# Patient Record
Sex: FEMALE | Race: WHITE | NOT HISPANIC OR LATINO
[De-identification: names, ages, dates, MRNs, and addresses within clinical notes are randomized per-mention and may not be internally consistent; named-entity substitution may affect disease eponyms.]

---

## 2018-05-07 ENCOUNTER — RESULT REVIEW (OUTPATIENT)
Age: 63
End: 2018-05-07

## 2018-05-08 PROBLEM — Z00.00 ENCOUNTER FOR PREVENTIVE HEALTH EXAMINATION: Status: ACTIVE | Noted: 2018-05-08

## 2018-05-22 ENCOUNTER — APPOINTMENT (OUTPATIENT)
Dept: RADIOLOGY | Facility: CLINIC | Age: 63
End: 2018-05-22
Payer: COMMERCIAL

## 2018-05-22 ENCOUNTER — APPOINTMENT (OUTPATIENT)
Dept: MAMMOGRAPHY | Facility: CLINIC | Age: 63
End: 2018-05-22
Payer: COMMERCIAL

## 2018-05-22 ENCOUNTER — OUTPATIENT (OUTPATIENT)
Dept: OUTPATIENT SERVICES | Facility: HOSPITAL | Age: 63
LOS: 1 days | End: 2018-05-22
Payer: COMMERCIAL

## 2018-05-22 ENCOUNTER — APPOINTMENT (OUTPATIENT)
Dept: ULTRASOUND IMAGING | Facility: CLINIC | Age: 63
End: 2018-05-22
Payer: COMMERCIAL

## 2018-05-22 DIAGNOSIS — Z12.31 ENCOUNTER FOR SCREENING MAMMOGRAM FOR MALIGNANT NEOPLASM OF BREAST: ICD-10-CM

## 2018-05-22 PROCEDURE — 77063 BREAST TOMOSYNTHESIS BI: CPT | Mod: 26

## 2018-05-22 PROCEDURE — 76641 ULTRASOUND BREAST COMPLETE: CPT | Mod: 26,50

## 2018-05-22 PROCEDURE — 77063 BREAST TOMOSYNTHESIS BI: CPT

## 2018-05-22 PROCEDURE — 77067 SCR MAMMO BI INCL CAD: CPT | Mod: 26

## 2018-05-22 PROCEDURE — 77080 DXA BONE DENSITY AXIAL: CPT | Mod: 26

## 2018-05-22 PROCEDURE — 77080 DXA BONE DENSITY AXIAL: CPT

## 2018-05-22 PROCEDURE — 77067 SCR MAMMO BI INCL CAD: CPT

## 2018-05-22 PROCEDURE — 76641 ULTRASOUND BREAST COMPLETE: CPT

## 2018-11-20 ENCOUNTER — APPOINTMENT (OUTPATIENT)
Dept: ENDOCRINOLOGY | Facility: CLINIC | Age: 63
End: 2018-11-20
Payer: COMMERCIAL

## 2018-11-20 VITALS
SYSTOLIC BLOOD PRESSURE: 139 MMHG | BODY MASS INDEX: 23.22 KG/M2 | WEIGHT: 123 LBS | HEIGHT: 61 IN | DIASTOLIC BLOOD PRESSURE: 74 MMHG | HEART RATE: 81 BPM

## 2018-11-20 DIAGNOSIS — Z87.39 PERSONAL HISTORY OF OTHER DISEASES OF THE MUSCULOSKELETAL SYSTEM AND CONNECTIVE TISSUE: ICD-10-CM

## 2018-11-20 DIAGNOSIS — Z84.2 FAMILY HISTORY OF OTHER DISEASES OF THE GENITOURINARY SYSTEM: ICD-10-CM

## 2018-11-20 DIAGNOSIS — Z56.0 UNEMPLOYMENT, UNSPECIFIED: ICD-10-CM

## 2018-11-20 PROCEDURE — 99205 OFFICE O/P NEW HI 60 MIN: CPT

## 2018-11-20 RX ORDER — CHROMIUM 200 MCG
25 TABLET ORAL
Refills: 0 | Status: ACTIVE | COMMUNITY

## 2018-11-20 RX ORDER — ZOLPIDEM TARTRATE 10 MG/1
10 TABLET, FILM COATED ORAL
Refills: 0 | Status: ACTIVE | COMMUNITY

## 2018-11-20 RX ORDER — PYRIDOXINE HCL (VITAMIN B6) 100 MG
TABLET ORAL
Refills: 0 | Status: ACTIVE | COMMUNITY

## 2018-11-20 RX ORDER — FLUTICASONE PROPIONATE 50 UG/1
50 SPRAY, METERED NASAL
Refills: 0 | Status: ACTIVE | COMMUNITY

## 2018-11-20 RX ORDER — PANTOPRAZOLE 40 MG/1
40 TABLET, DELAYED RELEASE ORAL
Refills: 0 | Status: ACTIVE | COMMUNITY

## 2018-11-20 RX ORDER — ERGOCALCIFEROL 1.25 MG/1
1.25 MG CAPSULE ORAL
Refills: 0 | Status: ACTIVE | COMMUNITY

## 2018-11-20 RX ORDER — AZELASTINE HYDROCHLORIDE 137 UG/1
137 SPRAY, METERED NASAL
Refills: 0 | Status: ACTIVE | COMMUNITY

## 2018-11-20 RX ORDER — ATORVASTATIN CALCIUM 10 MG/1
10 TABLET, FILM COATED ORAL
Refills: 0 | Status: ACTIVE | COMMUNITY

## 2018-11-20 SDOH — ECONOMIC STABILITY - INCOME SECURITY: UNEMPLOYMENT, UNSPECIFIED: Z56.0

## 2019-05-08 ENCOUNTER — APPOINTMENT (OUTPATIENT)
Dept: OBGYN | Facility: CLINIC | Age: 64
End: 2019-05-08
Payer: COMMERCIAL

## 2019-05-08 ENCOUNTER — RECORD ABSTRACTING (OUTPATIENT)
Age: 64
End: 2019-05-08

## 2019-05-08 ENCOUNTER — APPOINTMENT (OUTPATIENT)
Dept: OBGYN | Facility: CLINIC | Age: 64
End: 2019-05-08

## 2019-05-08 VITALS
SYSTOLIC BLOOD PRESSURE: 116 MMHG | HEIGHT: 61 IN | DIASTOLIC BLOOD PRESSURE: 68 MMHG | WEIGHT: 125 LBS | BODY MASS INDEX: 23.6 KG/M2

## 2019-05-08 DIAGNOSIS — Z82.49 FAMILY HISTORY OF ISCHEMIC HEART DISEASE AND OTHER DISEASES OF THE CIRCULATORY SYSTEM: ICD-10-CM

## 2019-05-08 DIAGNOSIS — G45.9 TRANSIENT CEREBRAL ISCHEMIC ATTACK, UNSPECIFIED: ICD-10-CM

## 2019-05-08 DIAGNOSIS — Z79.890 HORMONE REPLACEMENT THERAPY: ICD-10-CM

## 2019-05-08 DIAGNOSIS — D21.9 BENIGN NEOPLASM OF CONNECTIVE AND OTHER SOFT TISSUE, UNSPECIFIED: ICD-10-CM

## 2019-05-08 DIAGNOSIS — Z78.9 OTHER SPECIFIED HEALTH STATUS: ICD-10-CM

## 2019-05-08 DIAGNOSIS — Z92.89 PERSONAL HISTORY OF OTHER MEDICAL TREATMENT: ICD-10-CM

## 2019-05-08 LAB
BILIRUB UR QL STRIP: NORMAL
CYTOLOGY CVX/VAG DOC THIN PREP: NORMAL
CYTOLOGY CVX/VAG DOC THIN PREP: NORMAL
DATE COLLECTED: NORMAL
GLUCOSE UR-MCNC: NORMAL
HCG UR QL: 0.2 EU/DL
HEMOCCULT SP1 STL QL: NEGATIVE
HGB UR QL STRIP.AUTO: ABNORMAL
KETONES UR-MCNC: NORMAL
LEUKOCYTE ESTERASE UR QL STRIP: NORMAL
NITRITE UR QL STRIP: NORMAL
PH UR STRIP: 5.5
PROT UR STRIP-MCNC: NORMAL
QUALITY CONTROL: YES
SP GR UR STRIP: 1.01

## 2019-05-08 PROCEDURE — 82270 OCCULT BLOOD FECES: CPT

## 2019-05-08 PROCEDURE — 81003 URINALYSIS AUTO W/O SCOPE: CPT | Mod: QW

## 2019-05-08 PROCEDURE — 99396 PREV VISIT EST AGE 40-64: CPT

## 2019-05-08 NOTE — HISTORY OF PRESENT ILLNESS
[1 Year Ago] : 1 year ago [Healthy Diet] : a healthy diet [Good] : being in good health [Last Mammogram ___] : Last Mammogram was [unfilled] [Regular Exercise] : regular exercise [Last Pap ___] : Last cervical pap smear was [unfilled] [Last Bone Density ___] : Last bone density studies [unfilled] [Postmenopausal] : is postmenopausal [Hot Flashes] : hot flashes [unknown] : the patient is unsure of the date of her LMP [Post-Menopause, No Sxs] : post-menopausal, currently without symptoms [Sexually Active] : is sexually active [Monogamous] : is monogamous [Male ___] : [unfilled] male [Weight Concerns] : no concerns with her weight [de-identified] : DIANNE 6/1/2018 - BR 2 [Night Sweats] : no night sweats [Vaginal Itching] : no vaginal itching [Dyspareunia] : no dyspareunia [Mood Changes] : no mood changes [FreeTextEntry8] : VAGINAL DRYNESS [Contraception] : does not use contraception

## 2019-05-08 NOTE — REVIEW OF SYSTEMS
[Anxiety] : anxiety [Nl] : Hematologic/Lymphatic [Fever] : no fever [Chills] : no chills [Chest Pain] : no chest pain [Dyspnea] : no shortness of breath [Abdominal Pain] : no abdominal pain [Vomiting] : no vomiting [Pelvic Pain] : no pelvic pain [Abn Vag Bleeding] : no abnormal vaginal bleeding

## 2019-05-08 NOTE — PHYSICAL EXAM
[Awake] : awake [Alert] : alert [Acute Distress] : no acute distress [Mass] : no breast mass [Nipple Discharge] : no nipple discharge [Axillary LAD] : no axillary lymphadenopathy [Soft] : soft [Tender] : non tender [Distended] : not distended [Depressed Mood] : not depressed [Oriented x3] : oriented to person, place, and time [Flat Affect] : affect not flat [Normal] : uterus [Labia Majora] : labia major [Labia Minora] : labia minora [Atrophy] : atrophy [Pap Obtained] : a Pap smear was performed [No Tenderness] : no rectal tenderness [Occult Blood] : occult blood test from digital rectal exam was negative

## 2019-05-13 LAB
CYTOLOGY CVX/VAG DOC THIN PREP: NORMAL
HPV HIGH+LOW RISK DNA PNL CVX: NOT DETECTED

## 2019-05-22 ENCOUNTER — FORM ENCOUNTER (OUTPATIENT)
Age: 64
End: 2019-05-22

## 2019-05-23 ENCOUNTER — OUTPATIENT (OUTPATIENT)
Dept: OUTPATIENT SERVICES | Facility: HOSPITAL | Age: 64
LOS: 1 days | End: 2019-05-23
Payer: COMMERCIAL

## 2019-05-23 ENCOUNTER — APPOINTMENT (OUTPATIENT)
Dept: MAMMOGRAPHY | Facility: CLINIC | Age: 64
End: 2019-05-23
Payer: COMMERCIAL

## 2019-05-23 ENCOUNTER — APPOINTMENT (OUTPATIENT)
Dept: ULTRASOUND IMAGING | Facility: CLINIC | Age: 64
End: 2019-05-23
Payer: COMMERCIAL

## 2019-05-23 DIAGNOSIS — R92.8 OTHER ABNORMAL AND INCONCLUSIVE FINDINGS ON DIAGNOSTIC IMAGING OF BREAST: ICD-10-CM

## 2019-05-23 PROCEDURE — 76641 ULTRASOUND BREAST COMPLETE: CPT | Mod: 26,50

## 2019-05-23 PROCEDURE — 77067 SCR MAMMO BI INCL CAD: CPT

## 2019-05-23 PROCEDURE — 77067 SCR MAMMO BI INCL CAD: CPT | Mod: 26

## 2019-05-23 PROCEDURE — 77063 BREAST TOMOSYNTHESIS BI: CPT | Mod: 26

## 2019-05-23 PROCEDURE — 77063 BREAST TOMOSYNTHESIS BI: CPT

## 2019-05-23 PROCEDURE — 76641 ULTRASOUND BREAST COMPLETE: CPT

## 2020-10-26 ENCOUNTER — APPOINTMENT (OUTPATIENT)
Dept: OBGYN | Facility: CLINIC | Age: 65
End: 2020-10-26
Payer: COMMERCIAL

## 2020-10-26 VITALS
WEIGHT: 140 LBS | HEIGHT: 61 IN | DIASTOLIC BLOOD PRESSURE: 78 MMHG | SYSTOLIC BLOOD PRESSURE: 138 MMHG | BODY MASS INDEX: 26.43 KG/M2

## 2020-10-26 DIAGNOSIS — E04.2 NONTOXIC MULTINODULAR GOITER: ICD-10-CM

## 2020-10-26 DIAGNOSIS — R68.81 EARLY SATIETY: ICD-10-CM

## 2020-10-26 DIAGNOSIS — E06.3 AUTOIMMUNE THYROIDITIS: ICD-10-CM

## 2020-10-26 DIAGNOSIS — N64.4 MASTODYNIA: ICD-10-CM

## 2020-10-26 DIAGNOSIS — Z87.891 PERSONAL HISTORY OF NICOTINE DEPENDENCE: ICD-10-CM

## 2020-10-26 DIAGNOSIS — E78.5 HYPERLIPIDEMIA, UNSPECIFIED: ICD-10-CM

## 2020-10-26 DIAGNOSIS — R92.2 INCONCLUSIVE MAMMOGRAM: ICD-10-CM

## 2020-10-26 PROCEDURE — 99396 PREV VISIT EST AGE 40-64: CPT

## 2020-10-26 PROCEDURE — 99072 ADDL SUPL MATRL&STAF TM PHE: CPT

## 2020-10-26 RX ORDER — CLOBETASOL PROPIONATE 0.5 MG/ML
0.05 SOLUTION TOPICAL
Qty: 50 | Refills: 0 | Status: ACTIVE | COMMUNITY
Start: 2020-08-07

## 2020-10-26 RX ORDER — CLINDAMYCIN PHOSPHATE 1 G/10ML
1 GEL TOPICAL
Qty: 60 | Refills: 0 | Status: ACTIVE | COMMUNITY
Start: 2020-07-11

## 2020-10-26 RX ORDER — TRAMADOL HYDROCHLORIDE 50 MG/1
50 TABLET, COATED ORAL
Qty: 20 | Refills: 0 | Status: ACTIVE | COMMUNITY
Start: 2020-06-23

## 2020-10-26 RX ORDER — AZITHROMYCIN 250 MG/1
250 TABLET, FILM COATED ORAL
Qty: 6 | Refills: 0 | Status: ACTIVE | COMMUNITY
Start: 2020-06-23

## 2020-10-26 RX ORDER — CHLORHEXIDINE GLUCONATE, 0.12% ORAL RINSE 1.2 MG/ML
0.12 SOLUTION DENTAL
Qty: 473 | Refills: 0 | Status: ACTIVE | COMMUNITY
Start: 2020-06-23

## 2020-10-26 RX ORDER — CHOLECALCIFEROL (VITAMIN D3) 1250 MCG
1.25 MG CAPSULE ORAL
Qty: 12 | Refills: 0 | Status: ACTIVE | COMMUNITY
Start: 2020-06-10

## 2020-10-26 RX ORDER — BENZOYL PEROXIDE 100 MG/ML
10 LIQUID TOPICAL
Qty: 142 | Refills: 0 | Status: ACTIVE | COMMUNITY
Start: 2020-07-11

## 2020-10-26 NOTE — HISTORY OF PRESENT ILLNESS
[Patient reported breast sonogram was normal] : Patient reported breast sonogram was normal [Patient reported PAP Smear was normal] : Patient reported PAP Smear was normal [LMP unknown] : LMP unknown [postmenopausal] : postmenopausal [N] : Patient does not use contraception [Y] : Positive pregnancy history [unknown] : Patient is unsure of the date of her LMP [Menarche Age: ____] : age at menarche was [unfilled] [Previously active] : previously active [Men] : men [No] : No [TextBox_4] : 65-year-old patient presents for annual gynecological exam\par \par She has an increase in hot flashes, bilateral breast pain, anxiety symptoms and weight gain\par \par She is no longer on any hormone replacement therapy with her primary doctor Ananya but was inquiring about a hormonal patch and I advised against it related to the increased risk for invasive breast cancer and her nicotine use as well as her mother's thrombophilia disorder\par \par She feels since the stress of Covid she has been having a hard time managing her anxiety admits to smoking a lot more with her e-cigarette\par \par She had an upper endoscopy and colonoscopy which was negative from a colorectal perspective but noted reflux and we discussed the need to restrict smoking-cessation strategies reviewed\par \par Patient is having weekly acupuncture and massage for her history of anxiety fatigue and sleep disturbances\par \par She continues to use Ambien to sleep every night and admits that her  tells her he finds her eating at night in the kitchen and she does not remember this in the morning-we reviewed the precautions with Ambien and I do advise she discuss this with her medical doctor-try to wean off of Ambien slowly related to the side effect profile but only on the medical care\par \par I advised the patient to have a screening bilateral breast ultrasound for her diffuse bilateral intermittent breast pain with her annual screening mammogram this year\par \par Patient denies any lump on her self breast exam\par \par She is under the care of a rheumatologist and Dr. Hare and had a bone density in 2018 and she will follow-up for repeat and follow-up with rheumatology\par \par  [Mammogramdate] : 05/23/2019 [TextBox_19] : BR2 [BreastSonogramDate] : 05/23/2019 [TextBox_25] : BR2 [PapSmeardate] : 05/08/2019 [BoneDensityDate] : 05/22/2018 [TextBox_37] : osteopenia [PGHxTotal] : 4 [[facility]] : 3 [[facility]iving] : 3 [PGHxABInduced] : 1

## 2020-10-26 NOTE — PLAN
[FreeTextEntry1] : We discussed the differentials for breast pain and the importance of performing the monthly self breast exam.\par \par The benefits of evening primrose oil was discussed\par \par Patient advised to monitor and call for any persisting symptoms

## 2020-10-26 NOTE — COUNSELING
[Nutrition/ Exercise/ Weight Management] : nutrition, exercise, weight management [Vitamins/Supplements] : vitamins/supplements [Sunscreen] : sunscreen [Breast Self Exam] : breast self exam [Influenza Vaccine] : influenza vaccine

## 2020-11-02 LAB
CYTOLOGY CVX/VAG DOC THIN PREP: ABNORMAL
HPV HIGH+LOW RISK DNA PNL CVX: NOT DETECTED

## 2020-11-07 ENCOUNTER — RESULT REVIEW (OUTPATIENT)
Age: 65
End: 2020-11-07

## 2020-11-07 ENCOUNTER — OUTPATIENT (OUTPATIENT)
Dept: OUTPATIENT SERVICES | Facility: HOSPITAL | Age: 65
LOS: 1 days | End: 2020-11-07
Payer: COMMERCIAL

## 2020-11-07 ENCOUNTER — APPOINTMENT (OUTPATIENT)
Dept: ULTRASOUND IMAGING | Facility: CLINIC | Age: 65
End: 2020-11-07
Payer: COMMERCIAL

## 2020-11-07 ENCOUNTER — APPOINTMENT (OUTPATIENT)
Dept: MAMMOGRAPHY | Facility: CLINIC | Age: 65
End: 2020-11-07
Payer: COMMERCIAL

## 2020-11-07 DIAGNOSIS — Z01.419 ENCOUNTER FOR GYNECOLOGICAL EXAMINATION (GENERAL) (ROUTINE) WITHOUT ABNORMAL FINDINGS: ICD-10-CM

## 2020-11-07 DIAGNOSIS — R92.2 INCONCLUSIVE MAMMOGRAM: ICD-10-CM

## 2020-11-07 DIAGNOSIS — Z00.8 ENCOUNTER FOR OTHER GENERAL EXAMINATION: ICD-10-CM

## 2020-11-07 DIAGNOSIS — M85.80 OTHER SPECIFIED DISORDERS OF BONE DENSITY AND STRUCTURE, UNSPECIFIED SITE: ICD-10-CM

## 2020-11-07 PROCEDURE — 76641 ULTRASOUND BREAST COMPLETE: CPT

## 2020-11-07 PROCEDURE — 77067 SCR MAMMO BI INCL CAD: CPT | Mod: 26

## 2020-11-07 PROCEDURE — 76641 ULTRASOUND BREAST COMPLETE: CPT | Mod: 26,50

## 2020-11-07 PROCEDURE — 77080 DXA BONE DENSITY AXIAL: CPT | Mod: 26

## 2020-11-07 PROCEDURE — 77063 BREAST TOMOSYNTHESIS BI: CPT | Mod: 26

## 2020-11-07 PROCEDURE — 77067 SCR MAMMO BI INCL CAD: CPT

## 2020-11-07 PROCEDURE — 77080 DXA BONE DENSITY AXIAL: CPT

## 2020-11-07 PROCEDURE — 77063 BREAST TOMOSYNTHESIS BI: CPT

## 2020-12-11 ENCOUNTER — NON-APPOINTMENT (OUTPATIENT)
Age: 65
End: 2020-12-11

## 2021-11-16 ENCOUNTER — NON-APPOINTMENT (OUTPATIENT)
Age: 66
End: 2021-11-16

## 2021-11-16 ENCOUNTER — APPOINTMENT (OUTPATIENT)
Dept: OBGYN | Facility: CLINIC | Age: 66
End: 2021-11-16
Payer: COMMERCIAL

## 2021-11-16 VITALS
SYSTOLIC BLOOD PRESSURE: 122 MMHG | DIASTOLIC BLOOD PRESSURE: 68 MMHG | BODY MASS INDEX: 26.43 KG/M2 | WEIGHT: 140 LBS | HEIGHT: 61 IN

## 2021-11-16 DIAGNOSIS — M85.80 OTHER SPECIFIED DISORDERS OF BONE DENSITY AND STRUCTURE, UNSPECIFIED SITE: ICD-10-CM

## 2021-11-16 DIAGNOSIS — Z98.890 OTHER SPECIFIED POSTPROCEDURAL STATES: ICD-10-CM

## 2021-11-16 DIAGNOSIS — R23.2 FLUSHING: ICD-10-CM

## 2021-11-16 DIAGNOSIS — D26.9 OTHER BENIGN NEOPLASM OF UTERUS, UNSPECIFIED: ICD-10-CM

## 2021-11-16 PROCEDURE — 99397 PER PM REEVAL EST PAT 65+ YR: CPT

## 2021-11-16 NOTE — PLAN
[FreeTextEntry1] : Benefits of daily exercise vitamin D and calcium reviewed\par Benefits of monthly self breast exam reviewed\par Follow-up in 1 year or sooner as needed

## 2021-11-16 NOTE — HISTORY OF PRESENT ILLNESS
[LMP unknown] : LMP unknown [postmenopausal] : postmenopausal [N] : Patient does not use contraception [Y] : Positive pregnancy history [unknown] : Patient is unsure of the date of her LMP [Menarche Age: ____] : age at menarche was [unfilled] [Currently Active] : currently active [Men] : men [Vaginal] : vaginal [No] : No [Patient refuses STI testing] : Patient refuses STI testing [FreeTextEntry1] : 66-year-old patient presents for annual gynecological exam\par She is doing well and denies any postmenopausal bleeding\par \par She had a normal mammogram and screening breast ultrasound in November 2020 BI-RADS 2\par Her last bone density was November 2020\par She is scheduled for her screening colonoscopy and we reviewed the benefits of screening colonoscopy in the early detection and prevention of colorectal cancer\par \par Her daughter who also sees me lives in [facility]-\par  [Mammogramdate] : 11/07/20 [TextBox_19] : BR2 [TextBox_25] : BR2 [BreastSonogramDate] : 11/07/20 [PapSmeardate] : 10/26/20 [TextBox_31] : NEG [BoneDensityDate] : 11/0720 [HPVDate] : 10/26/20 [TextBox_78] : NEG [PGHxTotal] : 4 [[facility]Term] : 3 [PGHxAbortions] : 1

## 2021-11-22 ENCOUNTER — RESULT REVIEW (OUTPATIENT)
Age: 66
End: 2021-11-22

## 2021-11-22 ENCOUNTER — OUTPATIENT (OUTPATIENT)
Dept: OUTPATIENT SERVICES | Facility: HOSPITAL | Age: 66
LOS: 1 days | End: 2021-11-22
Payer: COMMERCIAL

## 2021-11-22 ENCOUNTER — APPOINTMENT (OUTPATIENT)
Dept: MAMMOGRAPHY | Facility: CLINIC | Age: 66
End: 2021-11-22
Payer: COMMERCIAL

## 2021-11-22 ENCOUNTER — APPOINTMENT (OUTPATIENT)
Dept: ULTRASOUND IMAGING | Facility: CLINIC | Age: 66
End: 2021-11-22
Payer: COMMERCIAL

## 2021-11-22 DIAGNOSIS — Z00.8 ENCOUNTER FOR OTHER GENERAL EXAMINATION: ICD-10-CM

## 2021-11-22 DIAGNOSIS — Z12.39 ENCOUNTER FOR OTHER SCREENING FOR MALIGNANT NEOPLASM OF BREAST: ICD-10-CM

## 2021-11-22 DIAGNOSIS — Z98.890 OTHER SPECIFIED POSTPROCEDURAL STATES: ICD-10-CM

## 2021-11-22 PROCEDURE — 76641 ULTRASOUND BREAST COMPLETE: CPT | Mod: 26,50

## 2021-11-22 PROCEDURE — 77067 SCR MAMMO BI INCL CAD: CPT

## 2021-11-22 PROCEDURE — 77067 SCR MAMMO BI INCL CAD: CPT | Mod: 26

## 2021-11-22 PROCEDURE — 77063 BREAST TOMOSYNTHESIS BI: CPT

## 2021-11-22 PROCEDURE — 76641 ULTRASOUND BREAST COMPLETE: CPT

## 2021-11-22 PROCEDURE — 77063 BREAST TOMOSYNTHESIS BI: CPT | Mod: 26

## 2021-11-23 LAB
CYTOLOGY CVX/VAG DOC THIN PREP: ABNORMAL
HPV HIGH+LOW RISK DNA PNL CVX: NOT DETECTED

## 2022-08-29 ENCOUNTER — APPOINTMENT (OUTPATIENT)
Dept: ORTHOPEDIC SURGERY | Facility: CLINIC | Age: 67
End: 2022-08-29

## 2022-08-29 VITALS — HEIGHT: 61 IN | WEIGHT: 135 LBS | BODY MASS INDEX: 25.49 KG/M2

## 2022-08-29 DIAGNOSIS — S46.219A STRAIN OF MUSCLE, FASCIA AND TENDON OF OTHER PARTS OF BICEPS, UNSPECIFIED ARM, INITIAL ENCOUNTER: ICD-10-CM

## 2022-08-29 DIAGNOSIS — M75.41 IMPINGEMENT SYNDROME OF RIGHT SHOULDER: ICD-10-CM

## 2022-08-29 DIAGNOSIS — M75.121 COMPLETE ROTATOR CUFF TEAR OR RUPTURE OF RIGHT SHOULDER, NOT SPECIFIED AS TRAUMATIC: ICD-10-CM

## 2022-08-29 DIAGNOSIS — M25.511 PAIN IN RIGHT SHOULDER: ICD-10-CM

## 2022-08-29 PROCEDURE — 73010 X-RAY EXAM OF SHOULDER BLADE: CPT | Mod: RT

## 2022-08-29 PROCEDURE — 73030 X-RAY EXAM OF SHOULDER: CPT | Mod: RT

## 2022-08-29 RX ORDER — METHYLPREDNISOLONE 4 MG/1
4 TABLET ORAL
Qty: 1 | Refills: 0 | Status: ACTIVE | COMMUNITY
Start: 2022-08-29 | End: 1900-01-01

## 2022-08-29 NOTE — IMAGING
[Right] : right shoulder [FreeTextEntry1] : There are AC changes.  The GH joint is OK. Osteopenia is suggested. [FreeTextEntry5] : There is a Type II acromion with a spur.

## 2022-08-29 NOTE — CONSULT LETTER
[Dear  ___] : Dear  [unfilled], [Consult Letter:] : I had the pleasure of evaluating your patient, [unfilled]. [Please see my note below.] : Please see my note below. [Consult Closing:] : Thank you very much for allowing me to participate in the care of this patient.  If you have any questions, please do not hesitate to contact me. [Sincerely,] : Sincerely, [FreeTextEntry3] : Nazario Inman M.D.\par Shoulder Surgery

## 2022-08-29 NOTE — PHYSICAL EXAM
[Right] : right shoulder [Sitting] : sitting [Mild] : mild [5___] : external rotation 5[unfilled]/5 [5 ___] : forward flexion 5[unfilled]/5 [] : no ecchymosis [de-identified] : -bellypress [TWNoteComboBox4] : passive forward flexion 165 degrees [TWNoteComboBox6] : internal rotation L1 [de-identified] : external rotation 75 degrees

## 2022-08-29 NOTE — HISTORY OF PRESENT ILLNESS
[4] : 4 [1] : 2 [Burning] : burning [Meds] : meds [] : no [FreeTextEntry1] : right shoulder, right thigh  [FreeTextEntry5] : pt started having pain to her right shoulder for over a year. pt felt like a pop in her right bicep and had immediate pain about 6 weeks ago, which has been getting better  [FreeTextEntry7] : down the arm  [FreeTextEntry9] : ibuprofen  [de-identified] : movement  [de-identified] : , pain management doctor   [de-identified] : stopped over a month ago  [de-identified] : MRI

## 2022-08-29 NOTE — ASSESSMENT
[FreeTextEntry1] : We reviewed the MRI findings.\par Treatment options reviewed. \par MDP is prescribed.\par PT is prescribed. \par Osteopenia noted on xray, a bone density scan is advised. \par Her and her 's questions were answered.\par Follow up in 6 weeks. \par \par Patient seen by Nazario Mata M.D.\par Entered by Cally Garcia acting as scribe.

## 2022-08-29 NOTE — REASON FOR VISIT
[FreeTextEntry2] : This is a 66 year old RHD F with right shoulder pain since October 2021.  She saw Dr. Bragg as her neck was hurting as well.  There were injections at that time and PT.  She had continued pain.  An MRI was taken.  In mid-August 2022, she felt a pop and had immediate pain in her biceps.  Dr. Bragg then repeated the MRI.  Reaching and lifting are affected.  No numbness.  No consistent medications.  She takes sedatives to sleep.  Overall, her pain is much better since the biceps injury.  Her  is here.

## 2022-08-29 NOTE — DATA REVIEWED
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 There is a small to medium full-thickness tear. There is partial subscapulars tearing. 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 There is a subacromial prominence. The biceps has ruptured. There is a small to medium full-thickness tear. There is a partial subscapularis tear. There is a small supraspinatus tear.

## 2022-10-10 ENCOUNTER — APPOINTMENT (OUTPATIENT)
Dept: ORTHOPEDIC SURGERY | Facility: CLINIC | Age: 67
End: 2022-10-10

## 2022-11-19 ENCOUNTER — APPOINTMENT (OUTPATIENT)
Dept: OBGYN | Facility: CLINIC | Age: 67
End: 2022-11-19

## 2022-11-21 ENCOUNTER — NON-APPOINTMENT (OUTPATIENT)
Age: 67
End: 2022-11-21

## 2022-11-21 ENCOUNTER — APPOINTMENT (OUTPATIENT)
Dept: OBGYN | Facility: CLINIC | Age: 67
End: 2022-11-21

## 2022-11-21 VITALS
BODY MASS INDEX: 22.66 KG/M2 | SYSTOLIC BLOOD PRESSURE: 126 MMHG | WEIGHT: 120 LBS | HEIGHT: 61 IN | DIASTOLIC BLOOD PRESSURE: 74 MMHG

## 2022-11-21 DIAGNOSIS — Z87.440 PERSONAL HISTORY OF URINARY (TRACT) INFECTIONS: ICD-10-CM

## 2022-11-21 PROCEDURE — 81003 URINALYSIS AUTO W/O SCOPE: CPT | Mod: QW

## 2022-11-21 PROCEDURE — 99397 PER PM REEVAL EST PAT 65+ YR: CPT

## 2022-11-21 NOTE — HISTORY OF PRESENT ILLNESS
[LMP unknown] : LMP unknown [postmenopausal] : postmenopausal [N] : Patient does not use contraception [Y] : Positive pregnancy history [unknown] : Patient is unsure of the date of her LMP [Menarche Age: ____] : age at menarche was [unfilled] [No] : Patient does not have concerns regarding sex [Currently Active] : currently active [FreeTextEntry1] : 67-year-old P3 postmenopausal patient presents for an annual gynecological exam.\par \par She denies any vaginal bleeding or lump on her self breast exam.\par \par Her last mammogram was normal November 2021 with a screening breast ultrasound BI-RADS 2.\par \par Patient has a density 2 years ago and we discussed repeat imaging and she was given a prescription.  We discussed the importance of daily exercise calcium and vitamin D.\par .\par Patient had a recent UTI and was treated at [facility].  Her urinalysis today notes trace blood.  She is asymptomatic.  Patient made aware we will send her urine out for culture and urinalysis and she should call in 2 days for the results.  If she has persisting blood in her urine she would need to see urology and patient verbalizes good understanding.  Patient denies any hematuria or change in bowel habits\par Patient had a normal colonoscopy 3 years ago\par \par Patient's daughter who is also my patient living in [facility] is getting  in [facility] next year they are very excited. [Mammogramdate] : 11/22/21 [TextBox_19] : BR2 [BreastSonogramDate] : 11/22/21 [TextBox_25] : BR2 [PapSmeardate] : 11/16/21 [TextBox_31] : NEG [BoneDensityDate] : 11/0720 [HPVDate] : 11/16/21 [TextBox_78] : NEG [PGHxTotal] : 4 [[facility] CenterlTerm] : 3 [PGHxAbortions] : 1 [[facility] Centeriving] : 3

## 2022-11-21 NOTE — PLAN
[FreeTextEntry1] : Follow-up for urine culture and urinalysis results 2 days\par Follow-up in 1 year for annual GYN exam or sooner as needed \par follow-up with dermatology annually for a skin screening exam\par Patient is with her hot flashes.  She is not taking any hormone therapy or bioidentical's\par She continues to be followed for her thyroid nodules Hashimoto history\par \par She had a screening pelvic sonogram with her new primary care that she has brought with her today we reviewed it together and it is normal\par

## 2022-11-25 LAB
APPEARANCE: CLEAR
BACTERIA UR CULT: NORMAL
BACTERIA: NEGATIVE
BILIRUB UR QL STRIP: NORMAL
BILIRUBIN URINE: NEGATIVE
BLOOD URINE: NEGATIVE
COLOR: NORMAL
GLUCOSE QUALITATIVE U: NEGATIVE
GLUCOSE UR-MCNC: NORMAL
HCG UR QL: 0.2 EU/DL
HGB UR QL STRIP.AUTO: ABNORMAL
HPV HIGH+LOW RISK DNA PNL CVX: NOT DETECTED
HYALINE CASTS: 0 /LPF
KETONES UR-MCNC: NORMAL
KETONES URINE: NEGATIVE
LEUKOCYTE ESTERASE UR QL STRIP: ABNORMAL
LEUKOCYTE ESTERASE URINE: NEGATIVE
MICROSCOPIC-UA: NORMAL
NITRITE UR QL STRIP: NORMAL
NITRITE URINE: NEGATIVE
PH UR STRIP: 6
PH URINE: 6.5
PROT UR STRIP-MCNC: NORMAL
PROTEIN URINE: NEGATIVE
RED BLOOD CELLS URINE: 1 /HPF
SP GR UR STRIP: 1.01
SPECIFIC GRAVITY URINE: 1.01
SQUAMOUS EPITHELIAL CELLS: 1 /HPF
UROBILINOGEN URINE: NORMAL
WHITE BLOOD CELLS URINE: 1 /HPF

## 2022-12-02 LAB — CYTOLOGY CVX/VAG DOC THIN PREP: ABNORMAL

## 2023-05-05 ENCOUNTER — OFFICE (OUTPATIENT)
Dept: URBAN - METROPOLITAN AREA CLINIC 115 | Facility: CLINIC | Age: 68
Setting detail: OPHTHALMOLOGY
End: 2023-05-05
Payer: COMMERCIAL

## 2023-05-05 DIAGNOSIS — H00.021: ICD-10-CM

## 2023-05-05 PROCEDURE — 92002 INTRM OPH EXAM NEW PATIENT: CPT | Performed by: OPTOMETRIST

## 2023-05-05 ASSESSMENT — VISUAL ACUITY
OD_BCVA: 20/25
OS_BCVA: 20/30-

## 2023-05-12 ENCOUNTER — OFFICE (OUTPATIENT)
Dept: URBAN - METROPOLITAN AREA CLINIC 115 | Facility: CLINIC | Age: 68
Setting detail: OPHTHALMOLOGY
End: 2023-05-12
Payer: COMMERCIAL

## 2023-05-12 DIAGNOSIS — H00.021: ICD-10-CM

## 2023-05-12 PROCEDURE — 99213 OFFICE O/P EST LOW 20 MIN: CPT | Performed by: OPTOMETRIST

## 2023-05-12 ASSESSMENT — VISUAL ACUITY
OS_BCVA: 20/30-2
OD_BCVA: 20/25-

## 2023-06-02 ENCOUNTER — OFFICE (OUTPATIENT)
Dept: URBAN - METROPOLITAN AREA CLINIC 115 | Facility: CLINIC | Age: 68
Setting detail: OPHTHALMOLOGY
End: 2023-06-02
Payer: COMMERCIAL

## 2023-06-02 DIAGNOSIS — H00.11: ICD-10-CM

## 2023-06-02 DIAGNOSIS — H00.021: ICD-10-CM

## 2023-06-02 PROCEDURE — 99213 OFFICE O/P EST LOW 20 MIN: CPT | Performed by: OPTOMETRIST

## 2023-06-02 ASSESSMENT — REFRACTION_AUTOREFRACTION
OS_AXIS: 097
OD_AXIS: 089
OS_CYLINDER: -0.25
OS_SPHERE: +2.75
OD_SPHERE: +2.25
OD_CYLINDER: -0.50

## 2023-06-02 ASSESSMENT — SPHEQUIV_DERIVED
OD_SPHEQUIV: 2
OS_SPHEQUIV: 2.625

## 2023-06-02 ASSESSMENT — VISUAL ACUITY
OD_BCVA: 20/25-
OS_BCVA: 20/30-2

## 2023-06-02 ASSESSMENT — TONOMETRY
OS_IOP_MMHG: 12
OD_IOP_MMHG: 15

## 2024-01-27 ENCOUNTER — OFFICE (OUTPATIENT)
Dept: URBAN - METROPOLITAN AREA CLINIC 12 | Facility: CLINIC | Age: 69
Setting detail: OPHTHALMOLOGY
End: 2024-01-27
Payer: COMMERCIAL

## 2024-01-27 DIAGNOSIS — H90.3: ICD-10-CM

## 2024-01-27 PROCEDURE — 92557 COMPREHENSIVE HEARING TEST: CPT | Performed by: AUDIOLOGIST-HEARING AID FITTER

## 2024-01-27 PROCEDURE — 92567 TYMPANOMETRY: CPT | Performed by: AUDIOLOGIST-HEARING AID FITTER

## 2024-01-27 PROCEDURE — V5261 HEARING AID, DIGIT, BIN, BTE: HCPCS | Performed by: AUDIOLOGIST-HEARING AID FITTER

## 2024-02-19 ENCOUNTER — OUTPATIENT (OUTPATIENT)
Dept: OUTPATIENT SERVICES | Facility: HOSPITAL | Age: 69
LOS: 1 days | End: 2024-02-19
Payer: COMMERCIAL

## 2024-02-19 ENCOUNTER — APPOINTMENT (OUTPATIENT)
Dept: RADIOLOGY | Facility: CLINIC | Age: 69
End: 2024-02-19
Payer: COMMERCIAL

## 2024-02-19 ENCOUNTER — APPOINTMENT (OUTPATIENT)
Dept: MAMMOGRAPHY | Facility: CLINIC | Age: 69
End: 2024-02-19
Payer: COMMERCIAL

## 2024-02-19 ENCOUNTER — APPOINTMENT (OUTPATIENT)
Dept: ULTRASOUND IMAGING | Facility: CLINIC | Age: 69
End: 2024-02-19
Payer: COMMERCIAL

## 2024-02-19 DIAGNOSIS — Z00.8 ENCOUNTER FOR OTHER GENERAL EXAMINATION: ICD-10-CM

## 2024-02-19 DIAGNOSIS — Z12.31 ENCOUNTER FOR SCREENING MAMMOGRAM FOR MALIGNANT NEOPLASM OF BREAST: ICD-10-CM

## 2024-02-19 PROCEDURE — 77063 BREAST TOMOSYNTHESIS BI: CPT | Mod: 26

## 2024-02-19 PROCEDURE — 76641 ULTRASOUND BREAST COMPLETE: CPT | Mod: 26,50

## 2024-02-19 PROCEDURE — 77080 DXA BONE DENSITY AXIAL: CPT | Mod: 26

## 2024-02-19 PROCEDURE — 77080 DXA BONE DENSITY AXIAL: CPT

## 2024-02-19 PROCEDURE — 77063 BREAST TOMOSYNTHESIS BI: CPT

## 2024-02-19 PROCEDURE — 77067 SCR MAMMO BI INCL CAD: CPT | Mod: 26

## 2024-02-19 PROCEDURE — 77067 SCR MAMMO BI INCL CAD: CPT

## 2024-02-19 PROCEDURE — 76641 ULTRASOUND BREAST COMPLETE: CPT

## 2024-03-30 ENCOUNTER — APPOINTMENT (OUTPATIENT)
Dept: OBGYN | Facility: CLINIC | Age: 69
End: 2024-03-30
Payer: COMMERCIAL

## 2024-03-30 ENCOUNTER — NON-APPOINTMENT (OUTPATIENT)
Age: 69
End: 2024-03-30

## 2024-03-30 VITALS
SYSTOLIC BLOOD PRESSURE: 122 MMHG | WEIGHT: 130 LBS | DIASTOLIC BLOOD PRESSURE: 64 MMHG | HEIGHT: 61 IN | BODY MASS INDEX: 24.55 KG/M2

## 2024-03-30 DIAGNOSIS — Z12.39 ENCOUNTER FOR OTHER SCREENING FOR MALIGNANT NEOPLASM OF BREAST: ICD-10-CM

## 2024-03-30 DIAGNOSIS — R92.30 DENSE BREASTS, UNSPECIFIED: ICD-10-CM

## 2024-03-30 DIAGNOSIS — Z12.4 ENCOUNTER FOR SCREENING FOR MALIGNANT NEOPLASM OF CERVIX: ICD-10-CM

## 2024-03-30 DIAGNOSIS — Z01.419 ENCOUNTER FOR GYNECOLOGICAL EXAMINATION (GENERAL) (ROUTINE) W/OUT ABNORMAL FINDINGS: ICD-10-CM

## 2024-03-30 PROCEDURE — 99397 PER PM REEVAL EST PAT 65+ YR: CPT

## 2024-03-30 NOTE — PHYSICAL EXAM
[Chaperone Present] : A chaperone was present in the examining room during all aspects of the physical examination [Appropriately responsive] : appropriately responsive [Alert] : alert [No Acute Distress] : no acute distress [No Lymphadenopathy] : no lymphadenopathy [Soft] : soft [Non-tender] : non-tender [Non-distended] : non-distended [No HSM] : No HSM [No Lesions] : no lesions [No Mass] : no mass [Examination Of The Breasts] : a normal appearance [Oriented x3] : oriented x3 [No Masses] : no breast masses were palpable [Labia Majora] : normal [Labia Minora] : normal [Normal] : normal [Uterine Adnexae] : normal

## 2024-03-30 NOTE — COUNSELING
[Nutrition/ Exercise/ Weight Management] : nutrition, exercise, weight management [Sunscreen] : sunscreen [Vitamins/Supplements] : vitamins/supplements [Breast Self Exam] : breast self exam

## 2024-03-30 NOTE — HISTORY OF PRESENT ILLNESS
[LMP unknown] : LMP unknown [N] : Patient does not use contraception [Menarche Age: ____] : age at menarche was [unfilled] [unknown] : Patient is unsure of the date of her LMP [Y] : Patient is sexually active [Currently Active] : currently active [Patient reported mammogram was normal] : Patient reported mammogram was normal [FreeTextEntry1] : 68-year-old P3 postmenopausal patient presents for an annual gynecological exam.  She denies any vaginal bleeding or lump on her self breast exam.  Her daughter who lives in [facility] was  in [place]- beautiful wedding-   [Patient reported PAP Smear was normal] : Patient reported PAP Smear was normal [Patient reported breast sonogram was normal] : Patient reported breast sonogram was normal [Patient reported colonoscopy was normal] : Patient reported colonoscopy was normal [Patient reported bone density results were abnormal] : Patient reported bone density results were abnormal [Mammogramdate] : 02/19/24 [TextBox_19] : BR2 [TextBox_25] : BR2 [BreastSonogramDate] : 02/19/24 [TextBox_31] : WNL [BoneDensityDate] : 02/19/24 [PapSmeardate] : 11/21/22 [ColonoscopyDate] : PT STATES HAD RECENT ONE-WILL CALL TO CHECK [TextBox_37] : OSTEOPENIA [HPVDate] : 11/21/22 [TextBox_78] : NEG [TextBox_43] : 2018 [PGHxTotal] : 4 [[facility]allTerm] : 3 [PGHxAbortions] : 1 [[facility] Centeriving] : 3

## 2024-04-20 LAB
CYTOLOGY CVX/VAG DOC THIN PREP: ABNORMAL
HPV HIGH+LOW RISK DNA PNL CVX: NOT DETECTED

## 2024-05-11 ENCOUNTER — OFFICE (OUTPATIENT)
Dept: URBAN - METROPOLITAN AREA CLINIC 12 | Facility: CLINIC | Age: 69
Setting detail: OPHTHALMOLOGY
End: 2024-05-11

## 2024-05-11 DIAGNOSIS — H90.3: ICD-10-CM

## 2024-05-11 PROCEDURE — HAD HEARING AID DISPENSE: Performed by: AUDIOLOGIST-HEARING AID FITTER

## 2024-08-16 ENCOUNTER — OFFICE (OUTPATIENT)
Dept: URBAN - METROPOLITAN AREA CLINIC 12 | Facility: CLINIC | Age: 69
Setting detail: OPHTHALMOLOGY
End: 2024-08-16

## 2024-08-16 DIAGNOSIS — H90.3: ICD-10-CM

## 2024-08-16 PROCEDURE — 92593 HEARING AID CHECK; BINAURAL: CPT | Performed by: AUDIOLOGIST-HEARING AID FITTER

## 2025-04-07 ENCOUNTER — APPOINTMENT (OUTPATIENT)
Dept: OBGYN | Facility: CLINIC | Age: 70
End: 2025-04-07

## 2025-04-07 VITALS
DIASTOLIC BLOOD PRESSURE: 76 MMHG | WEIGHT: 137 LBS | BODY MASS INDEX: 25.86 KG/M2 | HEIGHT: 61 IN | SYSTOLIC BLOOD PRESSURE: 110 MMHG

## 2025-04-07 DIAGNOSIS — Z78.0 ASYMPTOMATIC MENOPAUSAL STATE: ICD-10-CM

## 2025-04-07 DIAGNOSIS — R92.30 DENSE BREASTS, UNSPECIFIED: ICD-10-CM

## 2025-04-07 DIAGNOSIS — Z12.4 ENCOUNTER FOR SCREENING FOR MALIGNANT NEOPLASM OF CERVIX: ICD-10-CM

## 2025-04-07 DIAGNOSIS — Z01.419 ENCOUNTER FOR GYNECOLOGICAL EXAMINATION (GENERAL) (ROUTINE) W/OUT ABNORMAL FINDINGS: ICD-10-CM

## 2025-04-07 DIAGNOSIS — Z13.820 ENCOUNTER FOR SCREENING FOR OSTEOPOROSIS: ICD-10-CM

## 2025-04-07 DIAGNOSIS — Z12.39 ENCOUNTER FOR OTHER SCREENING FOR MALIGNANT NEOPLASM OF BREAST: ICD-10-CM

## 2025-04-07 PROCEDURE — 99397 PER PM REEVAL EST PAT 65+ YR: CPT

## 2025-04-11 PROBLEM — Z78.0 MENOPAUSE: Status: ACTIVE | Noted: 2025-04-11

## 2025-04-11 LAB
CYTOLOGY CVX/VAG DOC THIN PREP: ABNORMAL
HPV HIGH+LOW RISK DNA PNL CVX: NOT DETECTED

## 2025-05-12 ENCOUNTER — OFFICE (OUTPATIENT)
Dept: URBAN - METROPOLITAN AREA CLINIC 115 | Facility: CLINIC | Age: 70
Setting detail: OPHTHALMOLOGY
End: 2025-05-12
Payer: COMMERCIAL

## 2025-05-12 ENCOUNTER — RX ONLY (RX ONLY)
Age: 70
End: 2025-05-12

## 2025-05-12 DIAGNOSIS — H00.15: ICD-10-CM

## 2025-05-12 PROBLEM — H01.001 BLEPHARITIS; RIGHT UPPER LID, LEFT UPPER LID: Status: ACTIVE | Noted: 2025-05-12

## 2025-05-12 PROBLEM — H01.004 BLEPHARITIS; RIGHT UPPER LID, LEFT UPPER LID: Status: ACTIVE | Noted: 2025-05-12

## 2025-05-12 PROBLEM — H00.12 CHALAZION; RIGHT LOWER LID: Status: ACTIVE | Noted: 2025-05-12

## 2025-05-12 PROCEDURE — 92012 INTRM OPH EXAM EST PATIENT: CPT | Performed by: OPHTHALMOLOGY

## 2025-05-12 ASSESSMENT — REFRACTION_AUTOREFRACTION
OD_AXIS: 108
OS_AXIS: 114
OD_SPHERE: +2.25
OS_CYLINDER: -0.50
OD_CYLINDER: -0.25
OS_SPHERE: +2.50

## 2025-05-12 ASSESSMENT — LID EXAM ASSESSMENTS
OD_BLEPHARITIS: RUL 1+
OS_BLEPHARITIS: LUL 1+

## 2025-05-12 ASSESSMENT — CONFRONTATIONAL VISUAL FIELD TEST (CVF)
OS_FINDINGS: FULL
OD_FINDINGS: FULL

## 2025-05-12 ASSESSMENT — TONOMETRY
OS_IOP_MMHG: 15
OD_IOP_MMHG: 13

## 2025-05-12 ASSESSMENT — VISUAL ACUITY
OS_BCVA: 20/30
OD_BCVA: 20/25

## 2025-06-07 ENCOUNTER — OUTPATIENT (OUTPATIENT)
Dept: OUTPATIENT SERVICES | Facility: HOSPITAL | Age: 70
LOS: 1 days | End: 2025-06-07
Payer: COMMERCIAL

## 2025-06-07 ENCOUNTER — RESULT REVIEW (OUTPATIENT)
Age: 70
End: 2025-06-07

## 2025-06-07 ENCOUNTER — APPOINTMENT (OUTPATIENT)
Dept: MAMMOGRAPHY | Facility: CLINIC | Age: 70
End: 2025-06-07
Payer: COMMERCIAL

## 2025-06-07 ENCOUNTER — APPOINTMENT (OUTPATIENT)
Dept: ULTRASOUND IMAGING | Facility: CLINIC | Age: 70
End: 2025-06-07
Payer: COMMERCIAL

## 2025-06-07 DIAGNOSIS — Z12.39 ENCOUNTER FOR OTHER SCREENING FOR MALIGNANT NEOPLASM OF BREAST: ICD-10-CM

## 2025-06-07 DIAGNOSIS — Z00.8 ENCOUNTER FOR OTHER GENERAL EXAMINATION: ICD-10-CM

## 2025-06-07 DIAGNOSIS — R92.30 DENSE BREASTS, UNSPECIFIED: ICD-10-CM

## 2025-06-07 PROCEDURE — 76641 ULTRASOUND BREAST COMPLETE: CPT | Mod: 26,50

## 2025-06-07 PROCEDURE — 77067 SCR MAMMO BI INCL CAD: CPT | Mod: 26

## 2025-06-07 PROCEDURE — 77067 SCR MAMMO BI INCL CAD: CPT

## 2025-06-07 PROCEDURE — 77063 BREAST TOMOSYNTHESIS BI: CPT | Mod: 26

## 2025-06-07 PROCEDURE — 77063 BREAST TOMOSYNTHESIS BI: CPT

## 2025-06-07 PROCEDURE — 76641 ULTRASOUND BREAST COMPLETE: CPT

## 2025-06-23 ENCOUNTER — OFFICE (OUTPATIENT)
Dept: URBAN - METROPOLITAN AREA CLINIC 115 | Facility: CLINIC | Age: 70
Setting detail: OPHTHALMOLOGY
End: 2025-06-23
Payer: COMMERCIAL

## 2025-06-23 DIAGNOSIS — H01.004: ICD-10-CM

## 2025-06-23 DIAGNOSIS — H01.001: ICD-10-CM

## 2025-06-23 DIAGNOSIS — H00.12: ICD-10-CM

## 2025-06-23 PROCEDURE — 92012 INTRM OPH EXAM EST PATIENT: CPT | Performed by: OPHTHALMOLOGY

## 2025-06-23 ASSESSMENT — LID EXAM ASSESSMENTS
OD_BLEPHARITIS: RUL 1+
OS_BLEPHARITIS: LUL 1+

## 2025-06-23 ASSESSMENT — TONOMETRY
OS_IOP_MMHG: 11
OD_IOP_MMHG: 11

## 2025-06-23 ASSESSMENT — REFRACTION_AUTOREFRACTION
OS_SPHERE: +2.75
OS_AXIS: 090
OS_CYLINDER: -0.50
OD_SPHERE: +2.50

## 2025-06-23 ASSESSMENT — VISUAL ACUITY
OD_BCVA: 20/25
OS_BCVA: 20/25-

## 2025-06-23 ASSESSMENT — CONFRONTATIONAL VISUAL FIELD TEST (CVF)
OD_FINDINGS: FULL
OS_FINDINGS: FULL